# Patient Record
Sex: MALE | Race: WHITE | NOT HISPANIC OR LATINO | Employment: FULL TIME | URBAN - METROPOLITAN AREA
[De-identification: names, ages, dates, MRNs, and addresses within clinical notes are randomized per-mention and may not be internally consistent; named-entity substitution may affect disease eponyms.]

---

## 2020-09-10 NOTE — PROGRESS NOTES
This note was created by combination of typed  and M-Modal dictation.  Transcription errors may be present.  If there are any questions, please contact me.    Assessment and Plan:   Normal physical exam  Encounter for screening for HIV  Need for hepatitis C screening test  -return for fasting labs.  -     Comprehensive metabolic panel; Future; Expected date: 09/11/2020  -     CBC auto differential; Future; Expected date: 09/11/2020  -     Lipid Panel; Future; Expected date: 09/11/2020  -     Hemoglobin A1C; Future; Expected date: 09/11/2020  -     TSH; Future; Expected date: 09/11/2020  -     HIV 1/2 Ag/Ab (4th Gen); Future; Expected date: 09/11/2020  -     Hepatitis C Antibody; Future; Expected date: 09/11/2020    Subcutaneous nodule of right lower leg  -benign exam. Not c/w DVT, or varicose vein, or cellulitis or abscess.  No recent abx, no chronic meds, no obvious recent infection. Erythema nodosum? I favor monitoring and I expect slow but steady improvement  No signs/sx of systemic disease, no suspicious sx of inflammatory bowel disease, or respiratory sx.    Need for Tdap vaccination  -     (In Office Administered) Tdap Vaccine    There are no discontinued medications.    meds sent this encounter:       Follow Up: No follow-ups on file. plan for PEx 1 year if all normal.    Subjective:     Chief Complaint   Patient presents with    Mass     right leg    Establish Care       HPI  Juan Antonio is a 30 y.o. male, last appointment with this clinic was Visit date not found.    NP    Has been years since last check up.  No known health problems.     3-4 days of lump on the right anterior leg felt like bruise, pain like bruise, with pushing on it.  No inciting event no trigger. No previous instance of this. Just in the right anterior lower leg, the 1 area. Recent trip to [place], was out on a rope swing, landed on his legs in the water, was about 20 feet in the air when he landed, felt like it was a belly flop  but on the legs. But that was maybe a month ago.  It was an 8 hour trip each way, broken up into 4 hour shifts.    Not growing but not really shrinking.  Putting cool packs  No dyspnea.   No fever no chills. No uri sx  No diarrhea or constipation  Notes polyuria.   Sometimes LLQ pain intermittent, infrequent.    Patient Care Team:  Emory Cunningham MD as PCP - General (Internal Medicine)    There are no active problems to display for this patient.      PAST MEDICAL HISTORY:  Past Medical History:   Diagnosis Date    Childhood asthma        PAST SURGICAL HISTORY:  Past Surgical History:   Procedure Laterality Date    NASAL SEPTUM SURGERY       Family History   Problem Relation Age of Onset    Liver cancer Mother     No Known Problems Father     No Known Problems Brother     Crohn's disease Brother     No Known Problems Brother     No Known Problems Brother     No Known Problems Brother        SOCIAL HISTORY:  Social History     Socioeconomic History    Marital status: Single     Spouse name: Not on file    Number of children: Not on file    Years of education: Not on file    Highest education level: Not on file   Occupational History    Occupation: [employer], 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   Social Needs    Financial resource strain: Not on file    Food insecurity     Worry: Not on file     Inability: Not on file    Transportation needs     Medical: Not on file     Non-medical: Not on file   Tobacco Use    Smoking status: Never Smoker    Smokeless tobacco: Never Used   Substance and Sexual Activity    Alcohol use: Yes     Comment: occasional    Drug use: Never    Sexual activity: Not on file   Lifestyle    Physical activity     Days per week: Not on file     Minutes per session: Not on file    Stress: Not on file   Relationships    Social connections     Talks on phone: Not on file     Gets together: Not on file     Attends Orthodoxy service: Not on file     Active member of club or organization: Not on  "file     Attends meetings of clubs or organizations: Not on file     Relationship status: Not on file   Other Topics Concern    Not on file   Social History Narrative    Not on file       ALLERGIES AND MEDICATIONS: updated and reviewed.  Review of patient's allergies indicates:  Not on File         Review of Systems   Constitutional: Negative for fever, malaise/fatigue and weight loss.   HENT: Negative for congestion.    Eyes: Negative for blurred vision and pain.   Respiratory: Negative for shortness of breath and wheezing.    Cardiovascular: Negative for chest pain, palpitations and leg swelling.   Gastrointestinal: Positive for abdominal pain. Negative for blood in stool, constipation, diarrhea and heartburn.        Hpi   Genitourinary: Negative for dysuria.   Musculoskeletal: Negative for joint pain.   Neurological: Negative for tingling and headaches.       Objective:   Physical Exam   Vitals:    09/11/20 1421   BP: 122/60   BP Location: Right arm   Patient Position: Sitting   BP Method: Medium (Manual)   Pulse: 99   Temp: 97.7 °F (36.5 °C)   TempSrc: Temporal   SpO2: 97%   Weight: 66.7 kg (147 lb)   Height: 5' 11" (1.803 m)    Body mass index is 20.5 kg/m².  Weight: 66.7 kg (147 lb)   Height: 5' 11" (180.3 cm)     Physical Exam  Constitutional:       Appearance: Normal appearance. He is well-developed.   HENT:      Nose: Nose normal.   Eyes:      General: No scleral icterus.     Conjunctiva/sclera: Conjunctivae normal.   Neck:      Thyroid: No thyroid mass or thyromegaly.      Trachea: Trachea normal.   Cardiovascular:      Rate and Rhythm: Normal rate and regular rhythm.      Heart sounds: Normal heart sounds, S1 normal and S2 normal. No murmur.   Pulmonary:      Effort: Pulmonary effort is normal.      Breath sounds: Normal breath sounds.   Abdominal:      General: There is no distension.      Palpations: Abdomen is soft. There is no hepatomegaly, splenomegaly or mass.      Tenderness: There is no " abdominal tenderness.   Musculoskeletal:         General: Tenderness present. No deformity.      Right lower leg: No edema.      Left lower leg: No edema.      Comments: Right anterior lower leg, middle of the leg, about half dollar sized area of mild swelling and induration, nonfluctuant, no comedone, mild erythema, not hot   Lymphadenopathy:      Cervical: No cervical adenopathy.   Skin:     General: Skin is warm and dry.      Findings: No rash (on exposed skin).             Comments: On exposed skin   Neurological:      Mental Status: He is alert and oriented to person, place, and time.      Cranial Nerves: No cranial nerve deficit.      Sensory: No sensory deficit.      Deep Tendon Reflexes: Reflexes are normal and symmetric.   Psychiatric:         Speech: Speech normal.         Behavior: Behavior normal.         Thought Content: Thought content normal.         Judgment: Judgment normal.

## 2020-09-11 ENCOUNTER — OFFICE VISIT (OUTPATIENT)
Dept: FAMILY MEDICINE | Facility: CLINIC | Age: 30
End: 2020-09-11
Payer: COMMERCIAL

## 2020-09-11 VITALS
OXYGEN SATURATION: 95 % | TEMPERATURE: 98 F | DIASTOLIC BLOOD PRESSURE: 60 MMHG | SYSTOLIC BLOOD PRESSURE: 122 MMHG | HEART RATE: 99 BPM | WEIGHT: 147 LBS | BODY MASS INDEX: 20.58 KG/M2 | HEIGHT: 71 IN

## 2020-09-11 DIAGNOSIS — Z23 NEED FOR TDAP VACCINATION: ICD-10-CM

## 2020-09-11 DIAGNOSIS — Z11.59 NEED FOR HEPATITIS C SCREENING TEST: ICD-10-CM

## 2020-09-11 DIAGNOSIS — Z11.4 ENCOUNTER FOR SCREENING FOR HIV: ICD-10-CM

## 2020-09-11 DIAGNOSIS — R22.41 SUBCUTANEOUS NODULE OF RIGHT LOWER LEG: ICD-10-CM

## 2020-09-11 DIAGNOSIS — Z00.00 NORMAL PHYSICAL EXAM: Primary | ICD-10-CM

## 2020-09-11 PROCEDURE — 99999 PR PBB SHADOW E&M-NEW PATIENT-LVL III: CPT | Mod: PBBFAC,,, | Performed by: INTERNAL MEDICINE

## 2020-09-11 PROCEDURE — 99385 PREV VISIT NEW AGE 18-39: CPT | Mod: 25,S$GLB,, | Performed by: INTERNAL MEDICINE

## 2020-09-11 PROCEDURE — 90715 TDAP VACCINE GREATER THAN OR EQUAL TO 7YO IM: ICD-10-PCS | Mod: S$GLB,,, | Performed by: INTERNAL MEDICINE

## 2020-09-11 PROCEDURE — 90471 IMMUNIZATION ADMIN: CPT | Mod: S$GLB,,, | Performed by: INTERNAL MEDICINE

## 2020-09-11 PROCEDURE — 90471 TDAP VACCINE GREATER THAN OR EQUAL TO 7YO IM: ICD-10-PCS | Mod: S$GLB,,, | Performed by: INTERNAL MEDICINE

## 2020-09-11 PROCEDURE — 90715 TDAP VACCINE 7 YRS/> IM: CPT | Mod: S$GLB,,, | Performed by: INTERNAL MEDICINE

## 2020-09-11 PROCEDURE — 99385 PR PREVENTIVE VISIT,NEW,18-39: ICD-10-PCS | Mod: 25,S$GLB,, | Performed by: INTERNAL MEDICINE

## 2020-09-11 PROCEDURE — 99999 PR PBB SHADOW E&M-NEW PATIENT-LVL III: ICD-10-PCS | Mod: PBBFAC,,, | Performed by: INTERNAL MEDICINE

## 2020-09-11 NOTE — PROGRESS NOTES
Patient tolerated injection well.  Instructed to remain in lobby for 15 minutes and to report any adverse reactions right away.  Verbalized understanding.

## 2020-09-14 ENCOUNTER — LAB VISIT (OUTPATIENT)
Dept: LAB | Facility: HOSPITAL | Age: 30
End: 2020-09-14
Attending: INTERNAL MEDICINE
Payer: COMMERCIAL

## 2020-09-14 DIAGNOSIS — Z11.4 ENCOUNTER FOR SCREENING FOR HIV: ICD-10-CM

## 2020-09-14 DIAGNOSIS — Z00.00 NORMAL PHYSICAL EXAM: ICD-10-CM

## 2020-09-14 DIAGNOSIS — Z11.59 NEED FOR HEPATITIS C SCREENING TEST: ICD-10-CM

## 2020-09-14 LAB
ALBUMIN SERPL BCP-MCNC: 4.8 G/DL (ref 3.5–5.2)
ALP SERPL-CCNC: 66 U/L (ref 55–135)
ALT SERPL W/O P-5'-P-CCNC: 20 U/L (ref 10–44)
ANION GAP SERPL CALC-SCNC: 9 MMOL/L (ref 8–16)
AST SERPL-CCNC: 25 U/L (ref 10–40)
BASOPHILS # BLD AUTO: 0.03 K/UL (ref 0–0.2)
BASOPHILS NFR BLD: 0.4 % (ref 0–1.9)
BILIRUB SERPL-MCNC: 0.5 MG/DL (ref 0.1–1)
BUN SERPL-MCNC: 12 MG/DL (ref 6–20)
CALCIUM SERPL-MCNC: 9.7 MG/DL (ref 8.7–10.5)
CHLORIDE SERPL-SCNC: 104 MMOL/L (ref 95–110)
CHOLEST SERPL-MCNC: 175 MG/DL (ref 120–199)
CHOLEST/HDLC SERPL: 3.8 {RATIO} (ref 2–5)
CO2 SERPL-SCNC: 27 MMOL/L (ref 23–29)
CREAT SERPL-MCNC: 1 MG/DL (ref 0.5–1.4)
DIFFERENTIAL METHOD: ABNORMAL
EOSINOPHIL # BLD AUTO: 0.3 K/UL (ref 0–0.5)
EOSINOPHIL NFR BLD: 4.3 % (ref 0–8)
ERYTHROCYTE [DISTWIDTH] IN BLOOD BY AUTOMATED COUNT: 11.4 % (ref 11.5–14.5)
EST. GFR  (AFRICAN AMERICAN): >60 ML/MIN/1.73 M^2
EST. GFR  (NON AFRICAN AMERICAN): >60 ML/MIN/1.73 M^2
ESTIMATED AVG GLUCOSE: 103 MG/DL (ref 68–131)
GLUCOSE SERPL-MCNC: 89 MG/DL (ref 70–110)
HBA1C MFR BLD HPLC: 5.2 % (ref 4–5.6)
HCT VFR BLD AUTO: 47.3 % (ref 40–54)
HDLC SERPL-MCNC: 46 MG/DL (ref 40–75)
HDLC SERPL: 26.3 % (ref 20–50)
HGB BLD-MCNC: 15.5 G/DL (ref 14–18)
IMM GRANULOCYTES # BLD AUTO: 0.01 K/UL (ref 0–0.04)
IMM GRANULOCYTES NFR BLD AUTO: 0.1 % (ref 0–0.5)
LDLC SERPL CALC-MCNC: 123.6 MG/DL (ref 63–159)
LYMPHOCYTES # BLD AUTO: 2 K/UL (ref 1–4.8)
LYMPHOCYTES NFR BLD: 28.8 % (ref 18–48)
MCH RBC QN AUTO: 30 PG (ref 27–31)
MCHC RBC AUTO-ENTMCNC: 32.8 G/DL (ref 32–36)
MCV RBC AUTO: 92 FL (ref 82–98)
MONOCYTES # BLD AUTO: 0.6 K/UL (ref 0.3–1)
MONOCYTES NFR BLD: 8.2 % (ref 4–15)
NEUTROPHILS # BLD AUTO: 4 K/UL (ref 1.8–7.7)
NEUTROPHILS NFR BLD: 58.2 % (ref 38–73)
NONHDLC SERPL-MCNC: 129 MG/DL
NRBC BLD-RTO: 0 /100 WBC
PLATELET # BLD AUTO: 249 K/UL (ref 150–350)
PMV BLD AUTO: 10.7 FL (ref 9.2–12.9)
POTASSIUM SERPL-SCNC: 4.2 MMOL/L (ref 3.5–5.1)
PROT SERPL-MCNC: 7.9 G/DL (ref 6–8.4)
RBC # BLD AUTO: 5.17 M/UL (ref 4.6–6.2)
SODIUM SERPL-SCNC: 140 MMOL/L (ref 136–145)
T4 FREE SERPL-MCNC: 1.14 NG/DL (ref 0.71–1.51)
TRIGL SERPL-MCNC: 27 MG/DL (ref 30–150)
TSH SERPL DL<=0.005 MIU/L-ACNC: 0.38 UIU/ML (ref 0.4–4)
WBC # BLD AUTO: 6.95 K/UL (ref 3.9–12.7)

## 2020-09-14 PROCEDURE — 80061 LIPID PANEL: CPT

## 2020-09-14 PROCEDURE — 85025 COMPLETE CBC W/AUTO DIFF WBC: CPT

## 2020-09-14 PROCEDURE — 83036 HEMOGLOBIN GLYCOSYLATED A1C: CPT

## 2020-09-14 PROCEDURE — 86803 HEPATITIS C AB TEST: CPT

## 2020-09-14 PROCEDURE — 80053 COMPREHEN METABOLIC PANEL: CPT

## 2020-09-14 PROCEDURE — 84439 ASSAY OF FREE THYROXINE: CPT

## 2020-09-14 PROCEDURE — 86703 HIV-1/HIV-2 1 RESULT ANTBDY: CPT

## 2020-09-14 PROCEDURE — 36415 COLL VENOUS BLD VENIPUNCTURE: CPT | Mod: PO

## 2020-09-14 PROCEDURE — 84443 ASSAY THYROID STIM HORMONE: CPT

## 2020-09-15 DIAGNOSIS — R79.89 ABNORMAL TSH: Primary | ICD-10-CM

## 2020-09-15 LAB
HCV AB SERPL QL IA: NEGATIVE
HIV 1+2 AB+HIV1 P24 AG SERPL QL IA: NEGATIVE

## 2020-09-15 NOTE — PROGRESS NOTES
TSH mild low FT4 WNL  CBC, CMP WNL  Lipid normal  a1c WNL  HIV, HCV negative  Results to pt via [employer]. Repeat TSH 1-2 months.

## 2020-09-15 NOTE — PROGRESS NOTES
TSH mild low FT4 WNL  CBC, CMP WNL  Lipid normal  a1c WNL    HIV, HCV pending  Results so far to pt via [employer]. Repeat TSH 1-2 months.

## 2020-12-26 ENCOUNTER — CLINICAL SUPPORT (OUTPATIENT)
Dept: URGENT CARE | Facility: CLINIC | Age: 30
End: 2020-12-26
Payer: COMMERCIAL

## 2020-12-26 DIAGNOSIS — Z20.822 EXPOSURE TO COVID-19 VIRUS: Primary | ICD-10-CM

## 2020-12-26 DIAGNOSIS — U07.1 COVID-19 VIRUS DETECTED: ICD-10-CM

## 2020-12-26 LAB
CTP QC/QA: YES
SARS-COV-2 RDRP RESP QL NAA+PROBE: POSITIVE

## 2020-12-26 PROCEDURE — U0002 COVID-19 LAB TEST NON-CDC: HCPCS | Mod: QW,S$GLB,, | Performed by: PHYSICIAN ASSISTANT

## 2020-12-26 PROCEDURE — U0002: ICD-10-PCS | Mod: QW,S$GLB,, | Performed by: PHYSICIAN ASSISTANT

## 2020-12-26 NOTE — LETTER
[address] [facility] [place] [address]  Phone: [phone]  Fax: [phone]          Return to Work/School    Patient: Juan Antonio Mccann  YOB: 1990   Date: 12/26/2020     To Whom It May Concern:     Juan Antonio Mccann was in contact with/seen in my office on 12/26/2020. COVID-19 is present in our communities across the [facility]. There is limited testing for COVID at this time, so not all patients can be tested. In this situation, your employee meets the following criteria:     POSITIVE COVID TEST  -You have tested positive for COVID-19 today.  Please note that patients who test positive for COVID-19 are required by the CDC to undergo isolation for 10 days after their symptoms first began.  -This isolation starts from the day you first developed symptoms, not the day of your positive test. For example, if your symptoms began on a Monday but tested positive on the following Wednesday, your 10-day isolation begins from that Monday, not the Wednesday you tested positive.  -However, if you are asymptomatic (a person who does not have any symptoms) and COVID-19 positive, your 10-day isolation begins on the day you tested positive, regardless of exposure date.  -Also, per the CDC guidelines, once your 10 days have passed, and you have not had fever greater than 100.4F in the last 24 hours without taking any fever reducers such as Tylenol (Acetaminophen) or Motrin (Ibuprofen), you may return to your normal activities including social distancing, wearing masks, and frequent handwashing - YOU DO NOT NEED ANOTHER TEST IN ORDER TO END YOUR QUARANTINE       If you have any questions or concerns, or if I can be of further assistance, please do not hesitate to contact me.     Sincerely,    NURSE URGENT CARE, [facility]

## 2021-04-11 ENCOUNTER — CLINICAL SUPPORT (OUTPATIENT)
Dept: URGENT CARE | Facility: CLINIC | Age: 31
End: 2021-04-11
Payer: COMMERCIAL

## 2021-04-11 DIAGNOSIS — Z20.822 EXPOSURE TO COVID-19 VIRUS: Primary | ICD-10-CM

## 2021-04-11 LAB
CTP QC/QA: YES
SARS-COV-2 RDRP RESP QL NAA+PROBE: NEGATIVE

## 2021-04-11 PROCEDURE — U0002 COVID-19 LAB TEST NON-CDC: HCPCS | Mod: QW,S$GLB,, | Performed by: PHYSICIAN ASSISTANT

## 2021-04-11 PROCEDURE — U0002: ICD-10-PCS | Mod: QW,S$GLB,, | Performed by: PHYSICIAN ASSISTANT

## 2021-04-16 ENCOUNTER — PATIENT MESSAGE (OUTPATIENT)
Dept: RESEARCH | Facility: HOSPITAL | Age: 31
End: 2021-04-16

## 2021-10-02 ENCOUNTER — CLINICAL SUPPORT (OUTPATIENT)
Dept: URGENT CARE | Facility: CLINIC | Age: 31
End: 2021-10-02
Payer: COMMERCIAL

## 2021-10-02 DIAGNOSIS — Z20.822 COVID-19 VIRUS TEST RESULT UNKNOWN: Primary | ICD-10-CM

## 2021-10-02 LAB
CTP QC/QA: YES
SARS-COV-2 RDRP RESP QL NAA+PROBE: NEGATIVE

## 2021-10-02 PROCEDURE — U0002 COVID-19 LAB TEST NON-CDC: HCPCS | Mod: QW,S$GLB,, | Performed by: PHYSICIAN ASSISTANT

## 2021-10-02 PROCEDURE — U0002: ICD-10-PCS | Mod: QW,S$GLB,, | Performed by: PHYSICIAN ASSISTANT

## 2021-10-04 ENCOUNTER — PATIENT MESSAGE (OUTPATIENT)
Dept: ADMINISTRATIVE | Facility: HOSPITAL | Age: 31
End: 2021-10-04

## 2021-12-27 ENCOUNTER — LAB VISIT (OUTPATIENT)
Dept: PRIMARY CARE CLINIC | Facility: OTHER | Age: 31
End: 2021-12-27
Attending: INTERNAL MEDICINE
Payer: COMMERCIAL

## 2021-12-27 DIAGNOSIS — R05.9 COUGH: ICD-10-CM

## 2021-12-27 DIAGNOSIS — Z20.822 ENCOUNTER FOR LABORATORY TESTING FOR COVID-19 VIRUS: ICD-10-CM

## 2021-12-27 PROCEDURE — U0003 INFECTIOUS AGENT DETECTION BY NUCLEIC ACID (DNA OR RNA); SEVERE ACUTE RESPIRATORY SYNDROME CORONAVIRUS 2 (SARS-COV-2) (CORONAVIRUS DISEASE [COVID-19]), AMPLIFIED PROBE TECHNIQUE, MAKING USE OF HIGH THROUGHPUT TECHNOLOGIES AS DESCRIBED BY CMS-2020-01-R: HCPCS | Performed by: INTERNAL MEDICINE

## 2021-12-29 LAB
SARS-COV-2 RNA RESP QL NAA+PROBE: NOT DETECTED
SARS-COV-2- CYCLE NUMBER: NORMAL

## 2022-01-10 ENCOUNTER — OFFICE VISIT (OUTPATIENT)
Dept: FAMILY MEDICINE | Facility: CLINIC | Age: 32
End: 2022-01-10
Payer: COMMERCIAL

## 2022-01-10 ENCOUNTER — HOSPITAL ENCOUNTER (OUTPATIENT)
Dept: RADIOLOGY | Facility: HOSPITAL | Age: 32
Discharge: HOME | End: 2022-01-10
Attending: FAMILY MEDICINE
Payer: COMMERCIAL

## 2022-01-10 VITALS
BODY MASS INDEX: 20.63 KG/M2 | OXYGEN SATURATION: 98 % | TEMPERATURE: 98 F | HEIGHT: 71 IN | HEART RATE: 92 BPM | SYSTOLIC BLOOD PRESSURE: 112 MMHG | DIASTOLIC BLOOD PRESSURE: 66 MMHG | WEIGHT: 147.38 LBS

## 2022-01-10 DIAGNOSIS — Z00.00 ANNUAL PHYSICAL EXAM: Primary | ICD-10-CM

## 2022-01-10 DIAGNOSIS — L30.9 ECZEMA, UNSPECIFIED TYPE: ICD-10-CM

## 2022-01-10 DIAGNOSIS — G89.29 CHRONIC UPPER BACK PAIN: ICD-10-CM

## 2022-01-10 DIAGNOSIS — G89.29 CHRONIC LEFT SHOULDER PAIN: ICD-10-CM

## 2022-01-10 DIAGNOSIS — M25.512 CHRONIC LEFT SHOULDER PAIN: ICD-10-CM

## 2022-01-10 DIAGNOSIS — R10.32 LEFT LOWER QUADRANT ABDOMINAL PAIN: ICD-10-CM

## 2022-01-10 DIAGNOSIS — M54.9 CHRONIC UPPER BACK PAIN: ICD-10-CM

## 2022-01-10 PROCEDURE — 3008F PR BODY MASS INDEX (BMI) DOCUMENTED: ICD-10-PCS | Mod: CPTII,S$GLB,, | Performed by: FAMILY MEDICINE

## 2022-01-10 PROCEDURE — 3074F PR MOST RECENT SYSTOLIC BLOOD PRESSURE < 130 MM HG: ICD-10-PCS | Mod: CPTII,S$GLB,, | Performed by: FAMILY MEDICINE

## 2022-01-10 PROCEDURE — 73030 X-RAY EXAM OF SHOULDER: CPT | Mod: TC,FY,PO,LT

## 2022-01-10 PROCEDURE — 72070 X-RAY EXAM THORAC SPINE 2VWS: CPT | Mod: 26,,, | Performed by: RADIOLOGY

## 2022-01-10 PROCEDURE — 1159F MED LIST DOCD IN RCRD: CPT | Mod: CPTII,S$GLB,, | Performed by: FAMILY MEDICINE

## 2022-01-10 PROCEDURE — 99214 PR OFFICE/OUTPT VISIT, EST, LEVL IV, 30-39 MIN: ICD-10-PCS | Mod: S$GLB,,, | Performed by: FAMILY MEDICINE

## 2022-01-10 PROCEDURE — 99214 OFFICE O/P EST MOD 30 MIN: CPT | Mod: S$GLB,,, | Performed by: FAMILY MEDICINE

## 2022-01-10 PROCEDURE — 3078F DIAST BP <80 MM HG: CPT | Mod: CPTII,S$GLB,, | Performed by: FAMILY MEDICINE

## 2022-01-10 PROCEDURE — 73030 XR SHOULDER COMPLETE 2 OR MORE VIEWS LEFT: ICD-10-PCS | Mod: 26,LT,, | Performed by: RADIOLOGY

## 2022-01-10 PROCEDURE — 99999 PR PBB SHADOW E&M-EST. PATIENT-LVL IV: ICD-10-PCS | Mod: PBBFAC,,, | Performed by: FAMILY MEDICINE

## 2022-01-10 PROCEDURE — 72070 X-RAY EXAM THORAC SPINE 2VWS: CPT | Mod: TC,FY,PO

## 2022-01-10 PROCEDURE — 73030 X-RAY EXAM OF SHOULDER: CPT | Mod: 26,LT,, | Performed by: RADIOLOGY

## 2022-01-10 PROCEDURE — 1160F PR REVIEW ALL MEDS BY PRESCRIBER/CLIN PHARMACIST DOCUMENTED: ICD-10-PCS | Mod: CPTII,S$GLB,, | Performed by: FAMILY MEDICINE

## 2022-01-10 PROCEDURE — 3074F SYST BP LT 130 MM HG: CPT | Mod: CPTII,S$GLB,, | Performed by: FAMILY MEDICINE

## 2022-01-10 PROCEDURE — 3078F PR MOST RECENT DIASTOLIC BLOOD PRESSURE < 80 MM HG: ICD-10-PCS | Mod: CPTII,S$GLB,, | Performed by: FAMILY MEDICINE

## 2022-01-10 PROCEDURE — 72070 XR THORACIC SPINE AP LATERAL: ICD-10-PCS | Mod: 26,,, | Performed by: RADIOLOGY

## 2022-01-10 PROCEDURE — 99999 PR PBB SHADOW E&M-EST. PATIENT-LVL IV: CPT | Mod: PBBFAC,,, | Performed by: FAMILY MEDICINE

## 2022-01-10 PROCEDURE — 3008F BODY MASS INDEX DOCD: CPT | Mod: CPTII,S$GLB,, | Performed by: FAMILY MEDICINE

## 2022-01-10 PROCEDURE — 1160F RVW MEDS BY RX/DR IN RCRD: CPT | Mod: CPTII,S$GLB,, | Performed by: FAMILY MEDICINE

## 2022-01-10 PROCEDURE — 1159F PR MEDICATION LIST DOCUMENTED IN MEDICAL RECORD: ICD-10-PCS | Mod: CPTII,S$GLB,, | Performed by: FAMILY MEDICINE

## 2022-01-10 RX ORDER — HYDROCORTISONE 25 MG/G
CREAM TOPICAL 2 TIMES DAILY
Qty: 28 G | Refills: 0 | Status: SHIPPED | OUTPATIENT
Start: 2022-01-10

## 2022-01-10 NOTE — PROGRESS NOTES
Assessment & Plan  Annual physical exam  -     CBC Auto Differential; Future; Expected date: 01/10/2022  -     Comprehensive Metabolic Panel; Future; Expected date: 01/10/2022  -     Hemoglobin A1C; Future; Expected date: 01/10/2022  -     Lipid Panel; Future; Expected date: 01/10/2022    Routine fasting labs to be scheduled.    Left lower quadrant abdominal pain  -     CBC Auto Differential; Future; Expected date: 01/10/2022  -     Comprehensive Metabolic Panel; Future; Expected date: 01/10/2022  -     CT Abdomen Pelvis With Contrast; Future; Expected date: 01/10/2022  -     POCT CREATININE    Chronic upper back pain  -     X-Ray Thoracic Spine AP Lateral; Future; Expected date: 01/10/2022    Chronic left shoulder pain  -     X-Ray Shoulder 2 or More Views Left; Future; Expected date: 01/10/2022    Eczema, unspecified type  -     hydrocortisone 2.5 % cream; Apply topically 2 (two) times daily.  Dispense: 28 g; Refill: 0      Follow-up: Follow up if symptoms worsen or fail to improve.  ______________________________________________________________________    Chief Complaint  Chief Complaint   Patient presents with    Abdominal Pain     Lower left    4 Months       HPI  Juan Antonio Mccann is a 31 y.o. male with medical diagnoses as listed in the medical history and problem list that presents to the office c/o abdominal pain, upper back pain, and .     Abdominal Pain  This is a new problem. Episode onset: 4 months ago. The problem occurs constantly. The problem has been gradually worsening. The pain is located in the LLQ. The quality of the pain is sharp. The abdominal pain does not radiate. Pertinent negatives include no constipation, diarrhea, dysuria, fever, frequency, hematuria, nausea, vomiting or weight loss. Nothing aggravates the pain. Relieved by: applying pressure. He has tried nothing for the symptoms. His past medical history is significant for Crohn's disease (brother has Crohn disease).     Back  Pain  Patient c/o 4-5 year history of left-sided upper back pain and left scapular pain for which he plans to see a chiropractor. Requesting x-rays to be performed prior to this visit.    Rash  Patient c/o small rash on the left upper extremity with mild irritation/itching.    PAST MEDICAL HISTORY:  Past Medical History:   Diagnosis Date    Childhood asthma        PAST SURGICAL HISTORY:  Past Surgical History:   Procedure Laterality Date    NASAL SEPTUM SURGERY         SOCIAL HISTORY:  Social History     Socioeconomic History    Marital status: Single   Occupational History    Occupation: 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  Review of patient's allergies indicates:   Allergen Reactions    Cat dander Anaphylaxis and Itching     Current Outpatient Medications   Medication Sig Dispense Refill    hydrocortisone 2.5 % cream Apply topically 2 (two) times daily. 28 g 0     No current facility-administered medications for this visit.         ROS  Review of Systems   Constitutional: Negative for activity change, appetite change, chills, fever, unexpected weight change and weight loss.   Gastrointestinal: Positive for abdominal pain. Negative for constipation, diarrhea, nausea and vomiting.   Genitourinary: Negative for dysuria, flank pain, frequency, hematuria and urgency.   Musculoskeletal: Positive for back pain.   Skin: Positive for rash (left arm).   Neurological: Negative for weakness and light-headedness.  "  Psychiatric/Behavioral: The patient is nervous/anxious ("stress").            Physical Exam  Vitals:    01/10/22 0954   BP: 112/66   BP Location: Left arm   Patient Position: Sitting   BP Method: Medium (Manual)   Pulse: 92   Temp: 97.8 °F (36.6 °C)   TempSrc: Oral   SpO2: 98%   Weight: 66.8 kg (147 lb 6 oz)   Height: 5' 11" (1.803 m)    Body mass index is 20.55 kg/m².  Weight: 66.8 kg (147 lb 6 oz)   Height: 5' 11" (180.3 cm)   Physical Exam  Constitutional:       General: He is not in acute distress.  HENT:      Head: Normocephalic and atraumatic.   Neck:      Thyroid: No thyromegaly.   Cardiovascular:      Rate and Rhythm: Normal rate and regular rhythm.      Pulses: Normal pulses.      Heart sounds: Normal heart sounds.   Pulmonary:      Effort: Pulmonary effort is normal. No respiratory distress.      Breath sounds: Normal breath sounds.   Abdominal:      General: Abdomen is flat. Bowel sounds are normal. There is no distension.      Palpations: Abdomen is soft.      Tenderness: There is abdominal tenderness in the left upper quadrant and left lower quadrant. Negative signs include Noe's sign and McBurney's sign.      Hernia: No hernia is present.   Skin:     General: Skin is warm and dry.      Findings: Rash (small circular mildly erythematous, dry patch of skin on the left upper extremity) present.   Neurological:      General: No focal deficit present.      Mental Status: He is alert and oriented to person, place, and time.   Psychiatric:         Mood and Affect: Mood normal.         Behavior: Behavior normal.         Thought Content: Thought content normal.           "

## 2022-01-31 ENCOUNTER — HOSPITAL ENCOUNTER (OUTPATIENT)
Dept: RADIOLOGY | Facility: HOSPITAL | Age: 32
Discharge: HOME | End: 2022-01-31
Attending: FAMILY MEDICINE
Payer: COMMERCIAL

## 2022-01-31 DIAGNOSIS — R10.32 LEFT LOWER QUADRANT ABDOMINAL PAIN: ICD-10-CM

## 2022-01-31 PROCEDURE — 74177 CT ABDOMEN PELVIS WITH CONTRAST: ICD-10-PCS | Mod: 26,,, | Performed by: RADIOLOGY

## 2022-01-31 PROCEDURE — 74177 CT ABD & PELVIS W/CONTRAST: CPT | Mod: TC

## 2022-01-31 PROCEDURE — 74177 CT ABD & PELVIS W/CONTRAST: CPT | Mod: 26,,, | Performed by: RADIOLOGY

## 2022-01-31 PROCEDURE — 25500020 PHARM REV CODE 255: Performed by: FAMILY MEDICINE

## 2022-01-31 RX ADMIN — IOHEXOL 75 ML: 350 INJECTION, SOLUTION INTRAVENOUS at 09:01

## 2022-01-31 RX ADMIN — IOHEXOL 15 ML: 300 INJECTION, SOLUTION INTRAVENOUS at 09:01

## 2022-01-31 NOTE — PROGRESS NOTES
My name is Dr. Cintron and I am one of your PCP's partners that is covering the inbox while they are out.      Your CT results are showing a small incidental finding that could represent the reason you were having pain.  How is your abdominal pain doing?          Sincerely,  Fred Cintron  [email or web address]

## 2022-02-05 ENCOUNTER — PATIENT MESSAGE (OUTPATIENT)
Dept: FAMILY MEDICINE | Facility: CLINIC | Age: 32
End: 2022-02-05
Payer: COMMERCIAL

## 2022-02-05 DIAGNOSIS — K56.1 INTUSSUSCEPTION: Primary | ICD-10-CM

## 2022-02-21 ENCOUNTER — OFFICE VISIT (OUTPATIENT)
Dept: SURGERY | Facility: CLINIC | Age: 32
End: 2022-02-21
Payer: COMMERCIAL

## 2022-02-21 VITALS
SYSTOLIC BLOOD PRESSURE: 133 MMHG | HEIGHT: 71 IN | DIASTOLIC BLOOD PRESSURE: 84 MMHG | HEART RATE: 102 BPM | BODY MASS INDEX: 21.36 KG/M2 | WEIGHT: 152.56 LBS

## 2022-02-21 DIAGNOSIS — K56.1 INTUSSUSCEPTION: Primary | ICD-10-CM

## 2022-02-21 PROCEDURE — 3044F PR MOST RECENT HEMOGLOBIN A1C LEVEL <7.0%: ICD-10-PCS | Mod: CPTII,S$GLB,, | Performed by: SURGERY

## 2022-02-21 PROCEDURE — 3079F PR MOST RECENT DIASTOLIC BLOOD PRESSURE 80-89 MM HG: ICD-10-PCS | Mod: CPTII,S$GLB,, | Performed by: SURGERY

## 2022-02-21 PROCEDURE — 3008F BODY MASS INDEX DOCD: CPT | Mod: CPTII,S$GLB,, | Performed by: SURGERY

## 2022-02-21 PROCEDURE — 1160F PR REVIEW ALL MEDS BY PRESCRIBER/CLIN PHARMACIST DOCUMENTED: ICD-10-PCS | Mod: CPTII,S$GLB,, | Performed by: SURGERY

## 2022-02-21 PROCEDURE — 3075F SYST BP GE 130 - 139MM HG: CPT | Mod: CPTII,S$GLB,, | Performed by: SURGERY

## 2022-02-21 PROCEDURE — 1159F PR MEDICATION LIST DOCUMENTED IN MEDICAL RECORD: ICD-10-PCS | Mod: CPTII,S$GLB,, | Performed by: SURGERY

## 2022-02-21 PROCEDURE — 99204 PR OFFICE/OUTPT VISIT, NEW, LEVL IV, 45-59 MIN: ICD-10-PCS | Mod: S$GLB,,, | Performed by: SURGERY

## 2022-02-21 PROCEDURE — 99999 PR PBB SHADOW E&M-EST. PATIENT-LVL IV: ICD-10-PCS | Mod: PBBFAC,,, | Performed by: SURGERY

## 2022-02-21 PROCEDURE — 99204 OFFICE O/P NEW MOD 45 MIN: CPT | Mod: S$GLB,,, | Performed by: SURGERY

## 2022-02-21 PROCEDURE — 1160F RVW MEDS BY RX/DR IN RCRD: CPT | Mod: CPTII,S$GLB,, | Performed by: SURGERY

## 2022-02-21 PROCEDURE — 99999 PR PBB SHADOW E&M-EST. PATIENT-LVL IV: CPT | Mod: PBBFAC,,, | Performed by: SURGERY

## 2022-02-21 PROCEDURE — 1159F MED LIST DOCD IN RCRD: CPT | Mod: CPTII,S$GLB,, | Performed by: SURGERY

## 2022-02-21 PROCEDURE — 3079F DIAST BP 80-89 MM HG: CPT | Mod: CPTII,S$GLB,, | Performed by: SURGERY

## 2022-02-21 PROCEDURE — 3008F PR BODY MASS INDEX (BMI) DOCUMENTED: ICD-10-PCS | Mod: CPTII,S$GLB,, | Performed by: SURGERY

## 2022-02-21 PROCEDURE — 3044F HG A1C LEVEL LT 7.0%: CPT | Mod: CPTII,S$GLB,, | Performed by: SURGERY

## 2022-02-21 PROCEDURE — 3075F PR MOST RECENT SYSTOLIC BLOOD PRESS GE 130-139MM HG: ICD-10-PCS | Mod: CPTII,S$GLB,, | Performed by: SURGERY

## 2022-02-21 RX ORDER — LIDOCAINE HYDROCHLORIDE 10 MG/ML
1 INJECTION, SOLUTION EPIDURAL; INFILTRATION; INTRACAUDAL; PERINEURAL ONCE
Status: CANCELLED | OUTPATIENT
Start: 2022-02-21 | End: 2022-02-21

## 2022-02-21 RX ORDER — SODIUM CHLORIDE 9 MG/ML
INJECTION, SOLUTION INTRAVENOUS CONTINUOUS
Status: CANCELLED | OUTPATIENT
Start: 2022-02-21

## 2022-02-21 NOTE — H&P (VIEW-ONLY)
History and Physical Exam    Patient ID: Juan Antonio Mccann is a 31 y.o. male.    Chief Complaint: Consult      HPI:  32 y/o man with history of left lower quadrant abdominal pain.  Had CT scan and was found to have an intusseption.      Review of Systems   Constitutional: Negative for chills and unexpected weight change.   HENT: Negative for congestion, ear pain and sore throat.    Eyes: Negative for pain and redness.   Respiratory: Negative for cough and shortness of breath.    Cardiovascular: Negative for chest pain and palpitations.   Gastrointestinal: Negative for abdominal distention, abdominal pain and constipation.   Endocrine: Negative for cold intolerance and heat intolerance.   Genitourinary: Negative for dysuria and frequency.   Musculoskeletal: Negative for back pain and neck pain.   Skin: Negative for pallor and rash.   Neurological: Negative for syncope, light-headedness and headaches.   Hematological: Negative for adenopathy. Does not bruise/bleed easily.   Psychiatric/Behavioral: Negative for confusion and hallucinations.       Current Outpatient Medications   Medication Sig Dispense Refill    hydrocortisone 2.5 % cream Apply topically 2 (two) times daily. 28 g 0     No current facility-administered medications for this visit.       Review of patient's allergies indicates:   Allergen Reactions    Cat dander Anaphylaxis and Itching       Past Medical History:   Diagnosis Date    Childhood asthma        Past Surgical History:   Procedure Laterality Date    NASAL SEPTUM SURGERY         Family History   Problem Relation Age of Onset    Liver cancer Mother     No Known Problems Father     No Known Problems Brother     Crohn's disease Brother     No Known Problems Brother     No Known Problems Brother     No Known Problems Brother        Social History     Socioeconomic History    Marital status: Single   Occupational History    Occupation: [employer], 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   HENT:      Head: Normocephalic and atraumatic.   Eyes:      Pupils: Pupils are equal, round, and reactive to light.   Cardiovascular:      Rate and Rhythm: Normal rate and regular rhythm.      Heart sounds: No murmur heard.  Pulmonary:      Effort: Pulmonary effort is normal.      Breath sounds: Normal breath sounds. No wheezing.   Abdominal:      General: There is no distension.      Palpations: Abdomen is soft.      Tenderness: There is no abdominal tenderness.   Musculoskeletal:         General: Normal range of motion.      Cervical back: Normal range of motion and neck supple.   Skin:     General: Skin is warm and dry.      Capillary Refill: Capillary refill takes less than 2 seconds.      Findings: No rash.   Neurological:      Mental Status: He is alert and oriented to person, place, and time.   Psychiatric:         Judgment: Judgment normal.         Assessment & Plan:    intusseption, plan diagnositic laparoscpy and small bowel resection.     Risks, benefits, alternatives to the procedure were discussed with the patient, who appears to understand and agree with the treatment plan.

## 2022-02-21 NOTE — PROGRESS NOTES
History and Physical Exam    Patient ID: Juan Antonio Mccann is a 31 y.o. male.    Chief Complaint: Consult      HPI:  32 y/o man with history of left lower quadrant abdominal pain.  Had CT scan and was found to have an intusseption.      Review of Systems   Constitutional: Negative for chills and unexpected weight change.   HENT: Negative for congestion, ear pain and sore throat.    Eyes: Negative for pain and redness.   Respiratory: Negative for cough and shortness of breath.    Cardiovascular: Negative for chest pain and palpitations.   Gastrointestinal: Negative for abdominal distention, abdominal pain and constipation.   Endocrine: Negative for cold intolerance and heat intolerance.   Genitourinary: Negative for dysuria and frequency.   Musculoskeletal: Negative for back pain and neck pain.   Skin: Negative for pallor and rash.   Neurological: Negative for syncope, light-headedness and headaches.   Hematological: Negative for adenopathy. Does not bruise/bleed easily.   Psychiatric/Behavioral: Negative for confusion and hallucinations.       Current Outpatient Medications   Medication Sig Dispense Refill    hydrocortisone 2.5 % cream Apply topically 2 (two) times daily. 28 g 0     No current facility-administered medications for this visit.       Review of patient's allergies indicates:   Allergen Reactions    Cat dander Anaphylaxis and Itching       Past Medical History:   Diagnosis Date    Childhood asthma        Past Surgical History:   Procedure Laterality Date    NASAL SEPTUM SURGERY         Family History   Problem Relation Age of Onset    Liver cancer Mother     No Known Problems Father     No Known Problems Brother     Crohn's disease Brother     No Known Problems Brother     No Known Problems Brother     No Known Problems Brother        Social History     Socioeconomic History    Marital status: Single   Occupational History    Occupation: [employer], 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   HENT:      Head: Normocephalic and atraumatic.   Eyes:      Pupils: Pupils are equal, round, and reactive to light.   Cardiovascular:      Rate and Rhythm: Normal rate and regular rhythm.      Heart sounds: No murmur heard.  Pulmonary:      Effort: Pulmonary effort is normal.      Breath sounds: Normal breath sounds. No wheezing.   Abdominal:      General: There is no distension.      Palpations: Abdomen is soft.      Tenderness: There is no abdominal tenderness.   Musculoskeletal:         General: Normal range of motion.      Cervical back: Normal range of motion and neck supple.   Skin:     General: Skin is warm and dry.      Capillary Refill: Capillary refill takes less than 2 seconds.      Findings: No rash.   Neurological:      Mental Status: He is alert and oriented to person, place, and time.   Psychiatric:         Judgment: Judgment normal.         Assessment & Plan:    intusseption, plan diagnositic laparoscpy and small bowel resection.     Risks, benefits, alternatives to the procedure were discussed with the patient, who appears to understand and agree with the treatment plan.

## 2022-03-07 ENCOUNTER — ANESTHESIA EVENT (OUTPATIENT)
Dept: SURGERY | Facility: HOSPITAL | Age: 32
End: 2022-03-07
Payer: COMMERCIAL

## 2022-03-07 ENCOUNTER — HOSPITAL ENCOUNTER (OUTPATIENT)
Dept: PREADMISSION TESTING | Facility: HOSPITAL | Age: 32
Discharge: HOME | End: 2022-03-07
Attending: SURGERY
Payer: COMMERCIAL

## 2022-03-07 VITALS
HEIGHT: 72 IN | SYSTOLIC BLOOD PRESSURE: 107 MMHG | OXYGEN SATURATION: 97 % | BODY MASS INDEX: 19.74 KG/M2 | WEIGHT: 145.75 LBS | HEART RATE: 77 BPM | RESPIRATION RATE: 16 BRPM | TEMPERATURE: 98 F | DIASTOLIC BLOOD PRESSURE: 75 MMHG

## 2022-03-07 DIAGNOSIS — Z01.818 PREOP TESTING: Primary | ICD-10-CM

## 2022-03-07 DIAGNOSIS — Z01.812 ENCOUNTER FOR PREOPERATIVE SCREENING LABORATORY TESTING FOR COVID-19 VIRUS: ICD-10-CM

## 2022-03-07 DIAGNOSIS — Z11.52 ENCOUNTER FOR PREOPERATIVE SCREENING LABORATORY TESTING FOR COVID-19 VIRUS: ICD-10-CM

## 2022-03-07 LAB
BASOPHILS # BLD AUTO: 0.03 K/UL (ref 0–0.2)
BASOPHILS NFR BLD: 0.5 % (ref 0–1.9)
DIFFERENTIAL METHOD: ABNORMAL
EOSINOPHIL # BLD AUTO: 0.3 K/UL (ref 0–0.5)
EOSINOPHIL NFR BLD: 5 % (ref 0–8)
ERYTHROCYTE [DISTWIDTH] IN BLOOD BY AUTOMATED COUNT: 11.4 % (ref 11.5–14.5)
HCT VFR BLD AUTO: 45.4 % (ref 40–54)
HGB BLD-MCNC: 15.2 G/DL (ref 14–18)
IMM GRANULOCYTES # BLD AUTO: 0.02 K/UL (ref 0–0.04)
IMM GRANULOCYTES NFR BLD AUTO: 0.4 % (ref 0–0.5)
LYMPHOCYTES # BLD AUTO: 1.5 K/UL (ref 1–4.8)
LYMPHOCYTES NFR BLD: 26.5 % (ref 18–48)
MCH RBC QN AUTO: 30.5 PG (ref 27–31)
MCHC RBC AUTO-ENTMCNC: 33.5 G/DL (ref 32–36)
MCV RBC AUTO: 91 FL (ref 82–98)
MONOCYTES # BLD AUTO: 0.5 K/UL (ref 0.3–1)
MONOCYTES NFR BLD: 8.2 % (ref 4–15)
NEUTROPHILS # BLD AUTO: 3.4 K/UL (ref 1.8–7.7)
NEUTROPHILS NFR BLD: 59.4 % (ref 38–73)
NRBC BLD-RTO: 0 /100 WBC
PLATELET # BLD AUTO: 236 K/UL (ref 150–450)
PMV BLD AUTO: 10 FL (ref 9.2–12.9)
RBC # BLD AUTO: 4.99 M/UL (ref 4.6–6.2)
SARS-COV-2 RDRP RESP QL NAA+PROBE: NEGATIVE
WBC # BLD AUTO: 5.63 K/UL (ref 3.9–12.7)

## 2022-03-07 PROCEDURE — U0002 COVID-19 LAB TEST NON-CDC: HCPCS | Performed by: SURGERY

## 2022-03-07 PROCEDURE — 85025 COMPLETE CBC W/AUTO DIFF WBC: CPT | Performed by: SURGERY

## 2022-03-07 PROCEDURE — 36415 COLL VENOUS BLD VENIPUNCTURE: CPT | Performed by: SURGERY

## 2022-03-07 RX ORDER — AMOXICILLIN 500 MG
CAPSULE ORAL DAILY
COMMUNITY

## 2022-03-07 RX ORDER — MULTIVITAMIN
1 TABLET ORAL DAILY
COMMUNITY

## 2022-03-07 NOTE — DISCHARGE INSTRUCTIONS
Your surgery is scheduled for __Tuesday March 8, 2022_.    Call [phone] between 2 p.m. and 5 p.m. on   __Today_ to find out your arrival time for the day of your surgery.      Please report to SAME DAY SURGERY UNIT on the 2nd FLOOR at _______ a.m.        INSTRUCTIONS IMPORTANT!!!  ¨ Do not eat  after 12 midnight-. OK to brush teeth, no   gum, candy or mints!    MAY DRINK WATER UNTIL HOSPITAL ARRIVAL TIME        _x___  Prep instructions:  SHOWER     _x___  Please shower using Hibiclens soap the night before AND  the morning of your surgery/procedure. Do not use Hibiclens on your face or genitals      x         If your surgery is around your belly button (Navel) be sure to wash inside your belly button also. Rinse hibiclens off completely.  __x__  No shaving of procedural area at least 4-5 days before surgery due to increased risk of skin irritation and/or possible infection.    __x__  No powder, lotions or creams to your body.  __x__  You may wear only deodorant on the day of surgery.  __x__  Please remove all jewelry, including piercings and leave at home.  __x__  No money or valuables needed. Please leave at home.  You may bring your cell phone.  ____  Please bring any documents given by your doctor.  __x__  If going home the same day, arrange for a ride home. You will not be able to   drive if Anesthesia was used.  __x__  Wear loose fitting clothing. Allow for dressings, bandages.  __x__  Stop Aspirin, Ibuprofen, Motrin and Aleve at least 3-5 days before surgery, unless otherwise instructed by your doctor, or the nurse.         x     You MAY use Tylenol/acetaminophen until day of surgery.  __x__  Call MD for temperature above 101 degrees.        __x__ Stop taking any Fish Oil supplement or                   Vitamin E at least 5 days prior to surgery.          I have read or had read and explained to me, and understand the above information.  Additional comments or instructions:Please call   [phone] if you have  any questions regarding the instructions above.

## 2022-03-08 ENCOUNTER — ANESTHESIA (OUTPATIENT)
Dept: SURGERY | Facility: HOSPITAL | Age: 32
End: 2022-03-08
Payer: COMMERCIAL

## 2022-03-08 ENCOUNTER — HOSPITAL ENCOUNTER (OUTPATIENT)
Facility: HOSPITAL | Age: 32
Discharge: HOME | End: 2022-03-08
Attending: SURGERY | Admitting: SURGERY
Payer: COMMERCIAL

## 2022-03-08 VITALS
WEIGHT: 145.69 LBS | OXYGEN SATURATION: 99 % | DIASTOLIC BLOOD PRESSURE: 65 MMHG | BODY MASS INDEX: 19.76 KG/M2 | RESPIRATION RATE: 18 BRPM | SYSTOLIC BLOOD PRESSURE: 137 MMHG | HEART RATE: 65 BPM | TEMPERATURE: 98 F

## 2022-03-08 DIAGNOSIS — Z11.52 ENCOUNTER FOR PREOPERATIVE SCREENING LABORATORY TESTING FOR COVID-19 VIRUS: ICD-10-CM

## 2022-03-08 DIAGNOSIS — Z01.812 ENCOUNTER FOR PREOPERATIVE SCREENING LABORATORY TESTING FOR COVID-19 VIRUS: ICD-10-CM

## 2022-03-08 DIAGNOSIS — K56.1 INTUSSUSCEPTION: Primary | ICD-10-CM

## 2022-03-08 LAB — POCT GLUCOSE: 105 MG/DL (ref 70–110)

## 2022-03-08 PROCEDURE — 63600175 PHARM REV CODE 636 W HCPCS: Performed by: STUDENT IN AN ORGANIZED HEALTH CARE EDUCATION/TRAINING PROGRAM

## 2022-03-08 PROCEDURE — 37000009 HC ANESTHESIA EA ADD 15 MINS: Performed by: SURGERY

## 2022-03-08 PROCEDURE — 63600175 PHARM REV CODE 636 W HCPCS: Performed by: ANESTHESIOLOGY

## 2022-03-08 PROCEDURE — 71000016 HC POSTOP RECOV ADDL HR: Performed by: SURGERY

## 2022-03-08 PROCEDURE — D9220A PRA ANESTHESIA: Mod: CRNA,,, | Performed by: STUDENT IN AN ORGANIZED HEALTH CARE EDUCATION/TRAINING PROGRAM

## 2022-03-08 PROCEDURE — 25000003 PHARM REV CODE 250: Performed by: STUDENT IN AN ORGANIZED HEALTH CARE EDUCATION/TRAINING PROGRAM

## 2022-03-08 PROCEDURE — 36000708 HC OR TIME LEV III 1ST 15 MIN: Performed by: SURGERY

## 2022-03-08 PROCEDURE — 49320 DIAG LAPARO SEPARATE PROC: CPT | Mod: ,,, | Performed by: SURGERY

## 2022-03-08 PROCEDURE — 37000008 HC ANESTHESIA 1ST 15 MINUTES: Performed by: SURGERY

## 2022-03-08 PROCEDURE — 36000709 HC OR TIME LEV III EA ADD 15 MIN: Performed by: SURGERY

## 2022-03-08 PROCEDURE — 71000033 HC RECOVERY, INTIAL HOUR: Performed by: SURGERY

## 2022-03-08 PROCEDURE — 63600175 PHARM REV CODE 636 W HCPCS: Performed by: SURGERY

## 2022-03-08 PROCEDURE — 49320 PR LAP,DIAGNOSTIC ABDOMEN: ICD-10-PCS | Mod: ,,, | Performed by: SURGERY

## 2022-03-08 PROCEDURE — 71000015 HC POSTOP RECOV 1ST HR: Performed by: SURGERY

## 2022-03-08 PROCEDURE — D9220A PRA ANESTHESIA: ICD-10-PCS | Mod: ANES,,, | Performed by: ANESTHESIOLOGY

## 2022-03-08 PROCEDURE — 71000039 HC RECOVERY, EACH ADD'L HOUR: Performed by: SURGERY

## 2022-03-08 PROCEDURE — 82962 GLUCOSE BLOOD TEST: CPT | Performed by: SURGERY

## 2022-03-08 PROCEDURE — D9220A PRA ANESTHESIA: Mod: ANES,,, | Performed by: ANESTHESIOLOGY

## 2022-03-08 PROCEDURE — 25000003 PHARM REV CODE 250: Performed by: SURGERY

## 2022-03-08 PROCEDURE — D9220A PRA ANESTHESIA: ICD-10-PCS | Mod: CRNA,,, | Performed by: STUDENT IN AN ORGANIZED HEALTH CARE EDUCATION/TRAINING PROGRAM

## 2022-03-08 RX ORDER — FENTANYL CITRATE 50 UG/ML
INJECTION, SOLUTION INTRAMUSCULAR; INTRAVENOUS
Status: DISCONTINUED | OUTPATIENT
Start: 2022-03-08 | End: 2022-03-08

## 2022-03-08 RX ORDER — LIDOCAINE HYDROCHLORIDE 20 MG/ML
INJECTION INTRAVENOUS
Status: DISCONTINUED | OUTPATIENT
Start: 2022-03-08 | End: 2022-03-08

## 2022-03-08 RX ORDER — BUPIVACAINE HYDROCHLORIDE AND EPINEPHRINE 2.5; 5 MG/ML; UG/ML
INJECTION, SOLUTION EPIDURAL; INFILTRATION; INTRACAUDAL; PERINEURAL
Status: DISCONTINUED | OUTPATIENT
Start: 2022-03-08 | End: 2022-03-08 | Stop reason: HOSPADM

## 2022-03-08 RX ORDER — HYDROMORPHONE HYDROCHLORIDE 2 MG/ML
0.2 INJECTION, SOLUTION INTRAMUSCULAR; INTRAVENOUS; SUBCUTANEOUS EVERY 5 MIN PRN
Status: DISCONTINUED | OUTPATIENT
Start: 2022-03-08 | End: 2022-03-08 | Stop reason: HOSPADM

## 2022-03-08 RX ORDER — DEXAMETHASONE SODIUM PHOSPHATE 4 MG/ML
INJECTION, SOLUTION INTRA-ARTICULAR; INTRALESIONAL; INTRAMUSCULAR; INTRAVENOUS; SOFT TISSUE
Status: DISCONTINUED | OUTPATIENT
Start: 2022-03-08 | End: 2022-03-08

## 2022-03-08 RX ORDER — FENTANYL CITRATE 50 UG/ML
25 INJECTION, SOLUTION INTRAMUSCULAR; INTRAVENOUS EVERY 5 MIN PRN
Status: DISCONTINUED | OUTPATIENT
Start: 2022-03-08 | End: 2022-03-08 | Stop reason: HOSPADM

## 2022-03-08 RX ORDER — SODIUM CHLORIDE 0.9 % (FLUSH) 0.9 %
3 SYRINGE (ML) INJECTION
Status: DISCONTINUED | OUTPATIENT
Start: 2022-03-08 | End: 2022-03-08 | Stop reason: HOSPADM

## 2022-03-08 RX ORDER — OXYCODONE AND ACETAMINOPHEN 5; 325 MG/1; MG/1
1 TABLET ORAL ONCE
Status: COMPLETED | OUTPATIENT
Start: 2022-03-08 | End: 2022-03-08

## 2022-03-08 RX ORDER — SODIUM CHLORIDE, SODIUM LACTATE, POTASSIUM CHLORIDE, CALCIUM CHLORIDE 600; 310; 30; 20 MG/100ML; MG/100ML; MG/100ML; MG/100ML
INJECTION, SOLUTION INTRAVENOUS CONTINUOUS
Status: DISCONTINUED | OUTPATIENT
Start: 2022-03-08 | End: 2022-03-08 | Stop reason: HOSPADM

## 2022-03-08 RX ORDER — LIDOCAINE HYDROCHLORIDE 10 MG/ML
1 INJECTION, SOLUTION EPIDURAL; INFILTRATION; INTRACAUDAL; PERINEURAL ONCE
Status: DISCONTINUED | OUTPATIENT
Start: 2022-03-08 | End: 2022-03-08 | Stop reason: HOSPADM

## 2022-03-08 RX ORDER — SUCCINYLCHOLINE CHLORIDE 20 MG/ML
INJECTION INTRAMUSCULAR; INTRAVENOUS
Status: DISCONTINUED | OUTPATIENT
Start: 2022-03-08 | End: 2022-03-08

## 2022-03-08 RX ORDER — ONDANSETRON 2 MG/ML
INJECTION INTRAMUSCULAR; INTRAVENOUS
Status: DISCONTINUED | OUTPATIENT
Start: 2022-03-08 | End: 2022-03-08

## 2022-03-08 RX ORDER — CEFAZOLIN SODIUM 1 G/50ML
2 SOLUTION INTRAVENOUS
Status: COMPLETED | OUTPATIENT
Start: 2022-03-08 | End: 2022-03-08

## 2022-03-08 RX ORDER — PROPOFOL 10 MG/ML
VIAL (ML) INTRAVENOUS
Status: DISCONTINUED | OUTPATIENT
Start: 2022-03-08 | End: 2022-03-08

## 2022-03-08 RX ORDER — ROCURONIUM BROMIDE 10 MG/ML
INJECTION, SOLUTION INTRAVENOUS
Status: DISCONTINUED | OUTPATIENT
Start: 2022-03-08 | End: 2022-03-08

## 2022-03-08 RX ORDER — SODIUM CHLORIDE 9 MG/ML
INJECTION, SOLUTION INTRAVENOUS CONTINUOUS
Status: DISCONTINUED | OUTPATIENT
Start: 2022-03-08 | End: 2022-03-08 | Stop reason: HOSPADM

## 2022-03-08 RX ORDER — ACETAMINOPHEN 500 MG
1000 TABLET ORAL
Status: DISCONTINUED | OUTPATIENT
Start: 2022-03-08 | End: 2022-03-08 | Stop reason: HOSPADM

## 2022-03-08 RX ORDER — OXYCODONE AND ACETAMINOPHEN 5; 325 MG/1; MG/1
1 TABLET ORAL EVERY 4 HOURS PRN
Qty: 30 TABLET | Refills: 0 | Status: SHIPPED | OUTPATIENT
Start: 2022-03-08 | End: 2022-04-07

## 2022-03-08 RX ORDER — MIDAZOLAM HYDROCHLORIDE 1 MG/ML
INJECTION, SOLUTION INTRAMUSCULAR; INTRAVENOUS
Status: DISCONTINUED | OUTPATIENT
Start: 2022-03-08 | End: 2022-03-08

## 2022-03-08 RX ADMIN — HYDROMORPHONE HYDROCHLORIDE 0.2 MG: 2 INJECTION, SOLUTION INTRAMUSCULAR; INTRAVENOUS; SUBCUTANEOUS at 09:03

## 2022-03-08 RX ADMIN — DEXAMETHASONE SODIUM PHOSPHATE 4 MG: 4 INJECTION, SOLUTION INTRAMUSCULAR; INTRAVENOUS at 07:03

## 2022-03-08 RX ADMIN — PROPOFOL 50 MG: 10 INJECTION, EMULSION INTRAVENOUS at 07:03

## 2022-03-08 RX ADMIN — SODIUM CHLORIDE, SODIUM LACTATE, POTASSIUM CHLORIDE, AND CALCIUM CHLORIDE: .6; .31; .03; .02 INJECTION, SOLUTION INTRAVENOUS at 07:03

## 2022-03-08 RX ADMIN — ROCURONIUM BROMIDE 25 MG: 10 INJECTION, SOLUTION INTRAVENOUS at 07:03

## 2022-03-08 RX ADMIN — ROCURONIUM BROMIDE 5 MG: 10 INJECTION, SOLUTION INTRAVENOUS at 07:03

## 2022-03-08 RX ADMIN — FENTANYL CITRATE 25 MCG: 50 INJECTION, SOLUTION INTRAMUSCULAR; INTRAVENOUS at 08:03

## 2022-03-08 RX ADMIN — MIDAZOLAM HYDROCHLORIDE 2 MG: 1 INJECTION, SOLUTION INTRAMUSCULAR; INTRAVENOUS at 07:03

## 2022-03-08 RX ADMIN — ROCURONIUM BROMIDE 20 MG: 10 INJECTION, SOLUTION INTRAVENOUS at 07:03

## 2022-03-08 RX ADMIN — FENTANYL CITRATE 50 MCG: 50 INJECTION, SOLUTION INTRAMUSCULAR; INTRAVENOUS at 07:03

## 2022-03-08 RX ADMIN — LIDOCAINE HYDROCHLORIDE 100 MG: 20 INJECTION, SOLUTION INTRAVENOUS at 07:03

## 2022-03-08 RX ADMIN — CEFAZOLIN SODIUM 2 G: 1 SOLUTION INTRAVENOUS at 07:03

## 2022-03-08 RX ADMIN — OXYCODONE AND ACETAMINOPHEN 1 TABLET: 5; 325 TABLET ORAL at 10:03

## 2022-03-08 RX ADMIN — PROPOFOL 200 MG: 10 INJECTION, EMULSION INTRAVENOUS at 07:03

## 2022-03-08 RX ADMIN — SUGAMMADEX 200 MG: 100 INJECTION, SOLUTION INTRAVENOUS at 08:03

## 2022-03-08 RX ADMIN — SUCCINYLCHOLINE CHLORIDE 140 MG: 20 INJECTION, SOLUTION INTRAMUSCULAR; INTRAVENOUS at 07:03

## 2022-03-08 RX ADMIN — ONDANSETRON 4 MG: 2 INJECTION, SOLUTION INTRAMUSCULAR; INTRAVENOUS at 08:03

## 2022-03-08 NOTE — DISCHARGE SUMMARY
[facility] - [place] - Surgery  Discharge Note  Short Stay    Procedure(s) (LRB):  LAPAROSCOPY, DIAGNOSTIC (N/A)    OUTCOME: Patient tolerated treatment/procedure well without complication and is now ready for discharge.    DISPOSITION: Home or Self Care    FINAL DIAGNOSIS: intusseption  FOLLOWUP: In clinic    DISCHARGE INSTRUCTIONS:  No discharge procedures on file.     TIME SPENT ON DISCHARGE: 3 minutes

## 2022-03-08 NOTE — PLAN OF CARE
S/p diagnostic laparoscopy. Monica 10/10. The patient is AAO x 4 and his vitals are stable. Abdominal island dressings(3) are intact with a moderate amount of serosanguineous drainage present on 1. Pain is controlled and he denies nausea at this time. Recovery care is complete.

## 2022-03-08 NOTE — OP NOTE
.  [facility] - [place] - Surgery  General Surgery  Operative Note    SUMMARY     Date of Procedure: 3/8/2022     Procedure: Procedure(s) (LRB):  LAPAROSCOPY, DIAGNOSTIC (N/A)       Surgeon(s) and Role:     * Orlin Cheng MD - Primary    Assisting Surgeon: None    Pre-Operative Diagnosis: Intussusception [K56.1]    Post-Operative Diagnosis: Post-Op Diagnosis Codes:     * Intussusception [K56.1]    Anesthesia: General    Indications for Procedure: history of instusseption    Procedure in Detail: After informed consent was obtained, the patient was taken to the OR and placed supine in the OR table.  After anesthesia was administered, the patient was prepped and draped in standard surgical fashion.  A vertical incision was made and carried down to the fascia, the peritoneum was entered bluntly.  A Joe trocar was placed and gas was insufflated a 30 degree 5 mm scope was used to inspect the abdomen.  No evidence of injury was noted upon entry.  Additional trocars were placed under direct vision. The small intestine was examined from ligament of treitz to the cecum. Although dilation was prominent and the bowel was hyperactive no discrete lesion was identified or felt.      The trocars were removed, fascia was re-approximated using a 0 vicryl figure of 8. Skin was re-approximated using 4-0 vicryl. Sterile dressing was applied.    Significant Surgical Tasks Conducted by the Assistant(s), if Applicable: none    Estimated Blood Loss (EBL): min           Implants: * No implants in log *    Specimens:   Specimen (24h ago, onward)            None                  Condition: Good    Disposition: PACU - hemodynamically stable.    Attestation: I performed the procedure.

## 2022-03-08 NOTE — DISCHARGE INSTRUCTIONS
[facility]  Office # [phone]    Discharge Instructions for Same Day Surgery     Call the office for and appointment if one has not already been made.     Diet: Drink plenty of fluids the first 48 hours and you may resume your   usual diet.     Activity: No heavy lifting (over 10 pounds), pushing or pulling until your   post op visit. Your doctor's office may have told you to limit your lifting to less weight, or even no weight.  Be sure to follow those instructions.    Note: You may ride in a car and you may drive when comfortable.     Do not drive, drink alcohol, or sign legal documents for 24 hours, or if taking narcotic pain medication.    Dressings: Remove the dressing 24 hours after surgery. You may shower  24 hours after surgery and you may wash your hair.     If you have steri strips ( appears to be strips of white tape) on   your incision, leave them on. In 5-7 days they will begin to fall off.      Medical: Call the doctor for any of the following problems: fever above 101,   severe pain, bleeding, or abdominal distention (swelling).   If constipated you may take any stool softener you choose.     Occasionally small areas of skin numbness or an unpleasant skin sensation can result. Also, you may find that your incision is swollen and tender for a few days.  Some redness around sutures and staples is a normal reaction, but if the discomfort persists or worsens, call you doctor.             Fall Prevention  Millions of people fall every year and injure themselves. You may have had anesthesia or sedation which may increase your risk of falling. You may have health issues that put you at an increased risk of falling.     Here are ways to reduce your risk of falling.    Make your home safe by keeping walkways clear of objects you may trip over.  Use non-slip pads under rugs. Do not use area rugs or small throw rugs.  Use non-slip mats in bathtubs and showers.  Install handrails and  lights on staircases.  Do not walk in poorly lit areas.  Do not stand on chairs or wobbly ladders.  Use caution when reaching overhead or looking upward. This position can cause a loss of balance.  Be sure your shoes fit properly, have non-slip bottoms and are in good condition.   Wear shoes both inside and out. Avoid going barefoot or wearing slippers.  Be cautious when going up and down stairs, curbs, and when walking on uneven sidewalks.  If your balance is poor, consider using a cane or walker.  If your fall was related to alcohol use, stop or limit alcohol intake.   If your fall was related to use of sleeping medicines, talk to your doctor about this. You may need to reduce your dosage at bedtime if you awaken during the night to go to the bathroom.    To reduce the need for nighttime bathroom trips:  Avoid drinking fluids for several hours before going to bed  Empty your bladder before going to bed  Men can keep a urinal at the bedside  Stay as active as you can. Balance, flexibility, strength, and endurance all come from exercise. They all play a role in preventing falls. Ask your healthcare provider which types of activity are right for you.  Get your vision checked on a regular basis.  If you have pets, know where they are before you stand up or walk so you don't trip over them.  Use night lights.

## 2022-03-08 NOTE — ANESTHESIA PREPROCEDURE EVALUATION
03/08/2022  LUISITO Mccann is a 31 y.o., male.      Pre-op Assessment          Review of Systems  Anesthesia Hx:  No previous Anesthesia    Social:  Non-Smoker    Hematology/Oncology:  Hematology Normal   Oncology Normal     EENT/Dental:EENT/Dental Normal   Cardiovascular:  Cardiovascular Normal     Pulmonary:  Pulmonary Normal    Renal/:  Renal/ Normal     Hepatic/GI:  Hepatic/GI Normal    Musculoskeletal:  Musculoskeletal Normal    Neurological:  Neurology Normal    Endocrine:  Endocrine Normal    Dermatological:  Skin Normal    Psych:  Psychiatric Normal           Physical Exam  General: Well nourished, Cooperative, Alert and Oriented    Airway:  Mallampati: II   Mouth Opening: Normal  TM Distance: Normal  Tongue: Normal  Neck ROM: Normal ROM    Dental:  Intact        Anesthesia Plan  Type of Anesthesia, risks & benefits discussed:    Anesthesia Type: Gen ETT  Intra-op Monitoring Plan: Standard ASA Monitors  Post Op Pain Control Plan: multimodal analgesia  Induction:  IV  Airway Plan: , Post-Induction  Informed Consent: Informed consent signed with the Patient and all parties understand the risks and agree with anesthesia plan.  All questions answered.   ASA Score: 2  Day of Surgery Review of History & Physical: H&P Update referred to the surgeon/provider.  Anesthesia Plan Notes: npo    Ready For Surgery From Anesthesia Perspective.     .

## 2022-03-08 NOTE — PLAN OF CARE
Anesthesia Salvador notified that there was no type and screen done at this time.  Ok to proceed and send pt to holding per MD

## 2022-03-08 NOTE — ANESTHESIA PROCEDURE NOTES
Intubation    Date/Time: 3/8/2022 7:24 AM  Performed by: Jackie Tucker CRNA  Authorized by: Marvin Salvador MD     Intubation:     Induction:  Rapid sequence induction    Intubated:  Postinduction    Mask Ventilation:  N/a    Attempts:  1    Attempted By:  Student    Method of Intubation:  Video laryngoscopy    Blade:  Best 3    Laryngeal View Grade: Grade I - full view of cords      Difficult Airway Encountered?: No      Complications:  None    Airway Device:  Oral endotracheal tube    Airway Device Size:  7.5    Style/Cuff Inflation:  Cuffed (inflated to minimal occlusive pressure)    Tube secured:  23    Secured at:  The lips    Placement Verified By:  Capnometry and Revisualization with laryngoscopy    Complicating Factors:  None    Findings Post-Intubation:  BS equal bilateral and atraumatic/condition of teeth unchanged

## 2022-03-10 NOTE — ANESTHESIA POSTPROCEDURE EVALUATION
Anesthesia Post Evaluation    Patient: LUISITO Mccann    Procedure(s) Performed: Procedure(s) (LRB):  LAPAROSCOPY, DIAGNOSTIC (N/A)    Final Anesthesia Type: general      Patient location during evaluation: PACU  Patient participation: Yes- Able to Participate  Level of consciousness: awake and alert  Post-procedure vital signs: reviewed and stable  Pain management: adequate  Airway patency: patent    PONV status at discharge: No PONV  Anesthetic complications: no      Cardiovascular status: blood pressure returned to baseline and hemodynamically stable  Respiratory status: unassisted and spontaneous ventilation  Hydration status: euvolemic  Follow-up not needed.          Vitals Value Taken Time   /65 03/08/22 1137   Temp 36.4 °C (97.5 °F) 03/08/22 1137   Pulse 65 03/08/22 1137   Resp 18 03/08/22 1137   SpO2 99 % 03/08/22 1137         Event Time   Out of Recovery 09:49:00         Pain/Monica Score: Pain Rating Prior to Med Admin: 5 (3/8/2022 10:54 AM)  Pain Rating Post Med Admin: 4 (3/8/2022  9:49 AM)  Monica Score: 10 (3/8/2022 11:37 AM)  Modified Monica Score: 20 (3/8/2022 11:37 AM)

## 2022-03-23 ENCOUNTER — OFFICE VISIT (OUTPATIENT)
Dept: SURGERY | Facility: CLINIC | Age: 32
End: 2022-03-23
Payer: COMMERCIAL

## 2022-03-23 VITALS
HEART RATE: 105 BPM | BODY MASS INDEX: 20.62 KG/M2 | HEIGHT: 72 IN | DIASTOLIC BLOOD PRESSURE: 69 MMHG | WEIGHT: 152.25 LBS | SYSTOLIC BLOOD PRESSURE: 142 MMHG

## 2022-03-23 DIAGNOSIS — K56.1 INTUSSUSCEPTION: Primary | ICD-10-CM

## 2022-03-23 PROCEDURE — 99999 PR PBB SHADOW E&M-EST. PATIENT-LVL III: ICD-10-PCS | Mod: PBBFAC,,, | Performed by: SURGERY

## 2022-03-23 PROCEDURE — 3078F DIAST BP <80 MM HG: CPT | Mod: CPTII,S$GLB,, | Performed by: SURGERY

## 2022-03-23 PROCEDURE — 3044F HG A1C LEVEL LT 7.0%: CPT | Mod: CPTII,S$GLB,, | Performed by: SURGERY

## 2022-03-23 PROCEDURE — 3078F PR MOST RECENT DIASTOLIC BLOOD PRESSURE < 80 MM HG: ICD-10-PCS | Mod: CPTII,S$GLB,, | Performed by: SURGERY

## 2022-03-23 PROCEDURE — 1159F MED LIST DOCD IN RCRD: CPT | Mod: CPTII,S$GLB,, | Performed by: SURGERY

## 2022-03-23 PROCEDURE — 3008F BODY MASS INDEX DOCD: CPT | Mod: CPTII,S$GLB,, | Performed by: SURGERY

## 2022-03-23 PROCEDURE — 3008F PR BODY MASS INDEX (BMI) DOCUMENTED: ICD-10-PCS | Mod: CPTII,S$GLB,, | Performed by: SURGERY

## 2022-03-23 PROCEDURE — 99024 PR POST-OP FOLLOW-UP VISIT: ICD-10-PCS | Mod: S$GLB,,, | Performed by: SURGERY

## 2022-03-23 PROCEDURE — 1159F PR MEDICATION LIST DOCUMENTED IN MEDICAL RECORD: ICD-10-PCS | Mod: CPTII,S$GLB,, | Performed by: SURGERY

## 2022-03-23 PROCEDURE — 3077F PR MOST RECENT SYSTOLIC BLOOD PRESSURE >= 140 MM HG: ICD-10-PCS | Mod: CPTII,S$GLB,, | Performed by: SURGERY

## 2022-03-23 PROCEDURE — 3077F SYST BP >= 140 MM HG: CPT | Mod: CPTII,S$GLB,, | Performed by: SURGERY

## 2022-03-23 PROCEDURE — 99024 POSTOP FOLLOW-UP VISIT: CPT | Mod: S$GLB,,, | Performed by: SURGERY

## 2022-03-23 PROCEDURE — 99999 PR PBB SHADOW E&M-EST. PATIENT-LVL III: CPT | Mod: PBBFAC,,, | Performed by: SURGERY

## 2022-03-23 PROCEDURE — 3044F PR MOST RECENT HEMOGLOBIN A1C LEVEL <7.0%: ICD-10-PCS | Mod: CPTII,S$GLB,, | Performed by: SURGERY

## 2022-03-23 NOTE — PROGRESS NOTES
32. y/o man with history of diagnostic laparoscopy, now about 2 weeks out.  No complaints this am, reports feeling well.    PE: incision c/d/i no evidence of infection or hernia    Impression: post op, doing well    Plan: gradually resume normal activity, normal diet, and follow up as needed.

## 2023-02-01 ENCOUNTER — PATIENT OUTREACH (OUTPATIENT)
Dept: ADMINISTRATIVE | Facility: HOSPITAL | Age: 33
End: 2023-02-01
Payer: COMMERCIAL

## 2023-02-01 RX ORDER — AMOXICILLIN 500 MG/1
500 TABLET, FILM COATED ORAL 4 TIMES DAILY
COMMUNITY
Start: 2022-08-23

## 2023-02-01 RX ORDER — CLOBETASOL PROPIONATE 0.5 MG/G
OINTMENT TOPICAL 2 TIMES DAILY
COMMUNITY
Start: 2022-08-17

## 2023-02-01 RX ORDER — HYDROCORTISONE 25 MG/G
OINTMENT TOPICAL
COMMUNITY
Start: 2022-11-18

## 2023-02-01 RX ORDER — METHYLPREDNISOLONE 4 MG/1
TABLET ORAL
COMMUNITY
Start: 2022-08-23

## 2023-02-01 NOTE — PROGRESS NOTES
[facility] 1 Panel Continuity 01.17.2023 - the patient has confirmed that he is no longer seeing Dr. Peng. He has moved to [place].

## (undated) DEVICE — PACK ENDOSCOPY GENERAL

## (undated) DEVICE — BLADE SURG CARBON STEEL SZ11

## (undated) DEVICE — COVER OVERHEAD SURG LT BLUE

## (undated) DEVICE — SEE MEDLINE ITEM 157110

## (undated) DEVICE — TROCAR ENDOPATH XCEL 5X100MM

## (undated) DEVICE — BLANKET UPPER BODY 78.7X29.9IN

## (undated) DEVICE — SUT VICRYL PLUS 4-0 P3 18IN

## (undated) DEVICE — TROCAR KII BLLN 12MM 10CM

## (undated) DEVICE — GLOVE BIOGEL 7.5

## (undated) DEVICE — APPLICATOR CHLORAPREP ORN 26ML

## (undated) DEVICE — TOWEL OR DISP STRL BLUE 4/PK

## (undated) DEVICE — SET TUBE PNEUMOCLEAR SE HI FLO

## (undated) DEVICE — CONTAINER SPECIMEN STRL 4OZ

## (undated) DEVICE — SUT VICRYL+ 27 UR-6 VIOL

## (undated) DEVICE — ELECTRODE REM PLYHSV RETURN 9

## (undated) DEVICE — SEE L#120831

## (undated) DEVICE — KIT ANTIFOG

## (undated) DEVICE — Device

## (undated) DEVICE — CLOSURE SKIN STERI STRIP 1/2X4

## (undated) DEVICE — SYR 10CC LUER LOCK

## (undated) DEVICE — NDL HYPO REG 25G X 1 1/2

## (undated) DEVICE — DRESSING ADH ISLAND 2.5 X 3